# Patient Record
Sex: MALE | Race: WHITE | NOT HISPANIC OR LATINO | Employment: FULL TIME | URBAN - METROPOLITAN AREA
[De-identification: names, ages, dates, MRNs, and addresses within clinical notes are randomized per-mention and may not be internally consistent; named-entity substitution may affect disease eponyms.]

---

## 2020-07-08 ENCOUNTER — VIRTUAL VISIT (OUTPATIENT)
Dept: FAMILY MEDICINE | Facility: OTHER | Age: 25
End: 2020-07-08

## 2020-07-10 NOTE — PROGRESS NOTES
"Date: 2020 13:06:50  Clinician: Preston Sutton  Clinician NPI: [id number]  Patient: Henry Harris  Patient : 1995  Patient Address: [address] [place] [address]  Patient Phone: [phone]  Visit Protocol: URI  Patient Summary:  Henry is a 25 year old ( : 1995 ) male who initiated a Visit for COVID-19 (Coronavirus) evaluation and screening. When asked the question \"Please sign me up to receive news, health information and promotions from [employer].\", Henry responded \"No\".    Henry states his symptoms started 1-2 days ago.   His symptoms consist of malaise, a headache, chills, a sore throat, myalgia, and a cough. Henry also feels feverish but was unable to measure his temperature.   Symptom details     Cough: Henry coughs a few times an hour and his cough is not more bothersome at night. Phlegm does not come into his throat when he coughs. He does not believe his cough is caused by post-nasal drip.     Sore throat: Henry reports having mild throat pain (1-3 on a 10 point pain scale), does not have exudate on his tonsils, and can swallow liquids. He is not sure if the lymph nodes in his neck are enlarged. A rash has not appeared on the skin since the sore throat started.     Headache: He states the headache is mild (1-3 on a 10 point pain scale).      Henry denies having wheezing, nausea, teeth pain, ageusia, diarrhea, vomiting, rhinitis, ear pain, anosmia, facial pain or pressure, and nasal congestion. He also denies having recent facial or sinus surgery in the past 60 days, taking antibiotic medication in the past month, and having a sinus infection within the past year. He is not experiencing dyspnea.   Precipitating events  Within the past week, Henry has not been exposed to someone with strep throat. He has not recently been exposed to someone with influenza. Henry has not been in close contact with any high risk individuals.   Pertinent COVID-19 " (Coronavirus) information  In the past 14 days, Henry has not worked in a congregate living setting.   He does not work or volunteer as healthcare worker or a  and does not work or volunteer in a healthcare facility.   eHnry also has not lived in a congregate living setting in the past 14 days. He does not live with a healthcare worker.   Henry has had a close contact with a laboratory-confirmed COVID-19 patient within 14 days of symptom onset. Additional information about contact with COVID-19 (Coronavirus) patient as reported by the patient (free text): Exposed to three friends who have tested positive. Exposed last weekend. Exposed in a close group setting   Pertinent medical history  Henry does not need a return to work/school note.   Weight: 165 lbs   Henry does not smoke or use smokeless tobacco.   Weight: 165 lbs    MEDICATIONS: No current medications, ALLERGIES: NKDA  Clinician Response:  Dear Henry,   Your symptoms show that you may have coronavirus (COVID-19). This illness can cause fever, cough and trouble breathing. Many people get a mild case and get better on their own. Some people can get very sick.  What should I do?  We would like to test you for this virus.   1. Please call [phone] to schedule your visit. Explain that you were referred by [facility] to have a COVID-19 test. Be ready to share your [facility] visit ID number.  The following will serve as your written order for this COVID Test, ordered by me, for the indication of suspected COVID [Z20.828]: The test will be ordered in "[employer]", our electronic health record, after you are scheduled. It will show as ordered and authorized by Jace Lopez MD.  Order: COVID-19 (Coronavirus) PCR for SYMPTOMATIC testing from [facility].      2. When it's time for your COVID test:  Stay at least 6 feet away from others. (If someone will drive you to your test, stay in the backseat, as far away from the  as you can.)   Cover your  "mouth and nose with a mask, tissue or washcloth.  Go straight to the testing site. Don't make any stops on the way there or back.      3.Starting now: Stay home and away from others (self-isolate) until:   You've had no fever---and no medicine that reduces fever---for 3 full days (72 hours). And...   Your other symptoms have gotten better. For example, your cough or breathing has improved. And...   At least 10 days have passed since your symptoms started.       During this time, don't leave the house except for testing or medical care.   Stay in your own room, even for meals. Use your own bathroom if you can.   Stay away from others in your home. No hugging, kissing or shaking hands. No visitors.  Don't go to work, school or anywhere else.    Clean \"high touch\" surfaces often (doorknobs, counters, handles, etc.). Use a household cleaning spray or wipes. You'll find a full list of  on the EPA website: www.epa.gov/pesticide-registration/list-n-disinfectants-use-against-sars-cov-2.   Cover your mouth and nose with a mask, tissue or washcloth to avoid spreading germs.  Wash your hands and face often. Use soap and water.  Caregivers in these groups are at risk for severe illness due to COVID-19:  o People 65 years and older  o People who live in a nursing home or long-term care facility  o People with chronic disease (lung, heart, cancer, diabetes, kidney, liver, immunologic)  o People who have a weakened immune system, including those who:   Are in cancer treatment  Take medicine that weakens the immune system, such as corticosteroids  Had a bone marrow or organ transplant  Have an immune deficiency  Have poorly controlled HIV or AIDS  Are obese (body mass index of 40 or higher)  Smoke regularly   o Caregivers should wear gloves while washing dishes, handling laundry and cleaning bedrooms and bathrooms.  o Use caution when washing and drying laundry: Don't shake dirty laundry, and use the warmest water setting " that you can.  o For more tips, go to www.cdc.gov/coronavirus/2019-ncov/downloads/10Things.pdf.    4.Sign up for [employer]. We know it's scary to hear that you might have COVID-19. We want to track your symptoms to make sure you're okay over the next 2 weeks. Please look for an email from [employer]---this is a free, online program that we'll use to keep in touch. To sign up, follow the link in the email. Learn more at [email or web address]  How can I take care of myself?   Get lots of rest. Drink extra fluids (unless a doctor has told you not to).   Take Tylenol (acetaminophen) for fever or pain. If you have liver or kidney problems, ask your family doctor if it's okay to take Tylenol.   Adults can take either:    650 mg (two 325 mg pills) every 4 to 6 hours, or...   1,000 mg (two 500 mg pills) every 8 hours as needed.    Note: Don't take more than 3,000 mg in one day. Acetaminophen is found in many medicines (both prescribed and over-the-counter medicines). Read all labels to be sure you don't take too much.   For children, check the Tylenol bottle for the right dose. The dose is based on the child's age or weight.    If you have other health problems (like cancer, heart failure, an organ transplant or severe kidney disease): Call your specialty clinic if you don't feel better in the next 2 days.       Know when to call 911. Emergency warning signs include:    Trouble breathing or shortness of breath Pain or pressure in the chest that doesn't go away Feeling confused like you haven't felt before, or not being able to wake up Bluish-colored lips or face.  Where can I get more information?    [employer] -- About COVID-19: [email or web address]   CDC -- What to Do If You're Sick: www.cdc.gov/coronavirus/2019-ncov/about/steps-when-sick.html   CDC -- Ending Home Isolation: www.cdc.gov/coronavirus/2019-ncov/hcp/disposition-in-home-patients.html   CDC -- Caring for Someone:  www.cdc.gov/coronavirus/2019-ncov/if-you-are-sick/care-for-someone.html   [facility] -- Interim Guidance for Hospital Discharge to Home: www.health.[facility].[place]/diseases/coronavirus/hcp/hospdischarge.pdf   [facility] clinical trials (COVID-19 research studies): clinicalaffairs.[facility]-clinical-trials    Below are the COVID-19 hotlines at the [place] Department of Health ([facility]). Interpreters are available.    For health questions: Call 188-456-2491 or 1-729.917.7499 (7 a.m. to 7 p.m.) For questions about schools and childcare: Call 666-197-5264 or 1-911.321.3803 (7 a.m. to 7 p.m.)    Diagnosis: Pain in throat  Diagnosis ICD: R07.0

## 2021-05-25 ENCOUNTER — RECORDS - HEALTHEAST (OUTPATIENT)
Dept: ADMINISTRATIVE | Facility: CLINIC | Age: 26
End: 2021-05-25

## 2021-05-31 ENCOUNTER — RECORDS - HEALTHEAST (OUTPATIENT)
Dept: ADMINISTRATIVE | Facility: CLINIC | Age: 26
End: 2021-05-31

## 2023-07-16 ENCOUNTER — HOSPITAL ENCOUNTER (EMERGENCY)
Facility: CLINIC | Age: 28
Discharge: HOME | End: 2023-07-16
Attending: EMERGENCY MEDICINE | Admitting: EMERGENCY MEDICINE
Payer: COMMERCIAL

## 2023-07-16 VITALS
RESPIRATION RATE: 16 BRPM | TEMPERATURE: 97.1 F | OXYGEN SATURATION: 100 % | HEART RATE: 77 BPM | DIASTOLIC BLOOD PRESSURE: 86 MMHG | SYSTOLIC BLOOD PRESSURE: 128 MMHG

## 2023-07-16 DIAGNOSIS — S61.212A LACERATION OF RIGHT MIDDLE FINGER WITHOUT FOREIGN BODY WITHOUT DAMAGE TO NAIL, INITIAL ENCOUNTER: ICD-10-CM

## 2023-07-16 PROCEDURE — 12001 RPR S/N/AX/GEN/TRNK 2.5CM/<: CPT

## 2023-07-16 PROCEDURE — 99282 EMERGENCY DEPT VISIT SF MDM: CPT

## 2023-07-17 NOTE — DISCHARGE INSTRUCTIONS
Your sutures should be removed in about 10 to 12 days.  This can be done at most urgent cares, primary care clinics, or you may return to the emergency department to have it done.    Call your primary care provider to discuss your tetanus status to see if you need an update.

## 2023-07-17 NOTE — ED TRIAGE NOTES
laceration to middle finger of left hand. Sustained about 30 minutes ago from a knife while doing dishes. CMS intact. Bleeding controlled. Dressing applied.

## 2023-07-17 NOTE — ED PROVIDER NOTES
History     Chief Complaint:  Laceration       HPI   Henry Harris is a 28 year old male who presents for evaluation of a laceration.  Patient was helping to clean the kitchen tonight washing dishes when he cut his finger on a knife.  He is uncertain about his last tetanus shot but thinks he is probably had 1 within the last 10 years.      Independent Historian:   None - Patient Only    Review of External Notes:          Medications:    The patient is not currently taking any prescribed medications.     Past Medical History:    The patient denies any past medical history.     Past Surgical History:    The patient denies any past surgical history.     Physical Exam     Patient Vitals for the past 24 hrs:   BP Temp Temp src Pulse Resp SpO2   07/16/23 2147 128/86 97.1  F (36.2  C) Temporal 77 16 100 %        Physical Exam    Gen: Resting comfortably   Eyes: Clear conjunctiva, no discharge  Ears: External ears normal without swelling or drainage  Mouth: Mucous membranes moist  CV: regular rate  Resp: speaking in full sentences without any resp distress  Skin: warm dry well perfused, 1.5 cm laceration over the distal pad of the right long finger, no visible tendon sheath  Neuro: Alert, no gross motor or sensory deficits,   Psych: pleasant, affect appropriate        Emergency Department Course   ECG      Imaging:  No orders to display          Laboratory:  Labs Ordered and Resulted from Time of ED Arrival to Time of ED Departure - No data to display     Procedures     Laceration Repair      Procedure: Laceration Repair    Indication: Laceration    Consent: Verbal    Location: Right L third (middle) finger    Length: 1.5 cm    Preparation: Irrigation with Sterile Saline.    Anesthesia/Sedation: Lidocaine with Epinephrine - 1%      Treatment/Exploration: Wound explored, no foreign bodies found     Closure: The wound was closed with one layer. Skin/superficial layer was closed with 5 x 5-0 Polypropylene (prolene)  using  Interrupted sutures.     Patient Status: The patient tolerated the procedure well: Yes. There were no complications.       Emergency Department Course & Assessments:     Interventions:  Medications - No data to display     Assessments:  2156: The patient was seen and evaluated.     Independent Interpretation (X-rays, CTs, rhythm strip):  None    Consultations/Discussion of Management or Tests:  None        Social Determinants of Health affecting care:   None    Disposition:  The patient was discharged to home.     Impression & Plan    CMS Diagnoses:   and None          Medical Decision Making:  Patient presents emergency department with laceration on his finger.  He thinks he had a tetanus shot within the last 10 years but is not 100% certain.  We discussed this and he decided like to contact his primary care clinic and asked them.  He can follow-up there if he needs a booster.  Patient had a laceration which I was able to repair in the ED, superficial no other evidence of tendon involvement.  Discussed suture removal in 10 to 12 days, return cautions.  Patient is comfortable with management plan.        Diagnosis:    ICD-10-CM    1. Laceration of right middle finger without foreign body without damage to nail, initial encounter  S61.212A            Discharge Medications:  New Prescriptions    No medications on file          Scribe Disclosure:  I, Jose Jade, am serving as a scribe at 9:56 PM on 7/16/2023 to document services personally performed by Sudhir Mandujano MD based on my observations and the provider's statements to me.   7/16/2023   Sudhir Mandujano MD [place], Paul Anthony, MD  07/16/23 6348

## (undated) RX ORDER — LIDOCAINE HYDROCHLORIDE AND EPINEPHRINE 10; 10 MG/ML; UG/ML
INJECTION, SOLUTION INFILTRATION; PERINEURAL
Status: DISPENSED
Start: 2023-07-16